# Patient Record
Sex: FEMALE | Race: WHITE | URBAN - METROPOLITAN AREA
[De-identification: names, ages, dates, MRNs, and addresses within clinical notes are randomized per-mention and may not be internally consistent; named-entity substitution may affect disease eponyms.]

---

## 2018-03-30 ENCOUNTER — OFFICE VISIT (OUTPATIENT)
Dept: FAMILY MEDICINE CLINIC | Age: 38
End: 2018-03-30

## 2018-03-30 VITALS
HEIGHT: 67 IN | BODY MASS INDEX: 26.53 KG/M2 | DIASTOLIC BLOOD PRESSURE: 74 MMHG | RESPIRATION RATE: 18 BRPM | OXYGEN SATURATION: 97 % | WEIGHT: 169 LBS | HEART RATE: 88 BPM | TEMPERATURE: 98 F | SYSTOLIC BLOOD PRESSURE: 130 MMHG

## 2018-03-30 DIAGNOSIS — S99.929A INJURY OF TOE, UNSPECIFIED LATERALITY, INITIAL ENCOUNTER: Primary | ICD-10-CM

## 2018-03-30 DIAGNOSIS — D22.9 MULTIPLE NEVI: ICD-10-CM

## 2018-03-30 PROBLEM — F17.200 SMOKING: Status: ACTIVE | Noted: 2018-03-30

## 2018-03-30 PROBLEM — L63.9 ALOPECIA AREATA: Status: ACTIVE | Noted: 2018-03-30

## 2018-03-30 NOTE — PATIENT INSTRUCTIONS
> Monitor the toes. If the pain worsens, please remove the toenail polish and return.     > Monitor the moles. Watch for any other irregularities or changes. Consider having your dermatologist take a look at them.     > Return at your convenience, having fasted for 8-12 hours for a full physical (with labs). The Autoimmune Paleo Diet focuses on eating foods that are thought to have been eaten by our kacie-gatherer ancestors, and on eliminating foods that result in autoimmune reactions in some people. It includes many vegetables, some fruits, wild or pastured meats, and seafood. In the initial phases, it restricts grains (especially those containing gluten), dairy, nightshade plants, nuts, seeds, and legumes. People with a variety of autoimmune conditions have reported benefiting from the diet, including those with lupus, rhuematoid arthritis, irritable bowel disease, Hashimoto's disease and others. https://Synergy Pharm[employer]. Vendavo/opt-in/    https://Math[employer] Engineering & Education/aip-for-ibd-the-paleo-autoimmune-protocol-and-inflammatory-bowel-disease/    https://FriendFeed/health-news/health-wellness/articles/2015/01/16/is-the-autoimmune-paleo-diet-legit    Check out this article by Dr Aramis Merediht on natural ways to improve autoimmune disease:  https://LaunchKey/4-steps-to-heal-leaky-gut-and-autoimmune-disease/    Dr Paloma Silverio also has a great video on YouTube:   https://youTablo Publishing. be/GmUctI-wWKM  (search for [facility] [employer] NOBLE to overcome autoimmune disease\")         Foot Pain: Care Instructions  Your Care Instructions  Foot injuries that cause pain and swelling are fairly common. Almost all sports or home repair projects can cause a misstep that ends up as foot pain. Normal wear and tear, especially as you get older, also can cause foot pain. Most minor foot injuries will heal on their own, and home treatment is usually all you need to do. If you have a severe injury, you may need tests and treatment.   Follow-up care is a key part of your treatment and safety. Be sure to make and go to all appointments, and call your doctor if you are having problems. It's also a good idea to know your test results and keep a list of the medicines you take. How can you care for yourself at home? · Take pain medicines exactly as directed. ¨ If the doctor gave you a prescription medicine for pain, take it as prescribed. ¨ If you are not taking a prescription pain medicine, ask your doctor if you can take an over-the-counter medicine. · Rest and protect your foot. Take a break from any activity that may cause pain. · Put ice or a cold pack on your foot for 10 to 20 minutes at a time. Put a thin cloth between the ice and your skin. · Prop up the sore foot on a pillow when you ice it or anytime you sit or lie down during the next 3 days. Try to keep it above the level of your heart. This will help reduce swelling. · Your doctor may recommend that you wrap your foot with an elastic bandage. Keep your foot wrapped for as long as your doctor advises. · If your doctor recommends crutches, use them as directed. · Wear roomy footwear. · As soon as pain and swelling end, begin gentle exercises of your foot. Your doctor can tell you which exercises will help. When should you call for help? Call 911 anytime you think you may need emergency care. For example, call if:  ? · Your foot turns pale, white, blue, or cold. ?Call your doctor now or seek immediate medical care if:  ? · You cannot move or stand on your foot. ? · Your foot looks twisted or out of its normal position. ? · Your foot is not stable when you step down. ? · You have signs of infection, such as:  ¨ Increased pain, swelling, warmth, or redness. ¨ Red streaks leading from the sore area. ¨ Pus draining from a place on your foot. ¨ A fever. ? · Your foot is numb or tingly. ? Watch closely for changes in your health, and be sure to contact your doctor if:  ? · You do not get better as expected. ? · You have bruises from an injury that last longer than 2 weeks. Where can you learn more? Go to http://irma-shayan.info/. Enter E204 in the search box to learn more about \"Foot Pain: Care Instructions. \"  Current as of: March 21, 2017  Content Version: 11.4  © 6046-2442 [employer]. Care instructions adapted under license by [employer] (which disclaims liability or warranty for this information). If you have questions about a medical condition or this instruction, always ask your healthcare professional. [place] [address] any warranty or liability for your use of this information.

## 2018-03-30 NOTE — PROGRESS NOTES
Rm:1    Chief Complaint   Patient presents with    Toe Pain     pt states she got Pedicure on Sunday and now has bilateral foot and toe pain     Flu Shot Requested: no    Depression Screening:  PHQ over the last two weeks 3/30/2018   Little interest or pleasure in doing things Not at all   Feeling down, depressed or hopeless Not at all   Total Score PHQ 2 0       Learning Assessment:  Learning Assessment 3/30/2018   PRIMARY LEARNER Patient   HIGHEST LEVEL OF EDUCATION - PRIMARY LEARNER  SOME COLLEGE   PRIMARY LANGUAGE ENGLISH   LEARNER PREFERENCE PRIMARY DEMONSTRATION   ANSWERED BY patient   RELATIONSHIP SELF       Abuse Screening:  No flowsheet data found. Health Maintenance reviewed and discussed per provider: yes     Coordination of Care:    1. Have you been to the ER, urgent care clinic since your last visit? Hospitalized since your last visit? no    2. Have you seen or consulted any other health care providers outside of the [address] [place] [address] since your last visit? Include any pap smears or colon screening.  no

## 2018-03-30 NOTE — MR AVS SNAPSHOT
[address] [address] 
[phone] Patient: Renaldo Grayson MRN: [id number] :1980 Visit Information Date & Time Provider Department Dept. Phone Encounter #  
 3/30/2018  2:15 PM Yunier Rios PA-C [facility] [phone] [id number] Follow-up Instructions Return in about 4 weeks (around 2018) for CPE. Upcoming Health Maintenance Date Due Pneumococcal 19-64 Medium Risk (1 of 1 - PPSV23) 12/3/1998 DTaP/Tdap/Td series (1 - Tdap) 12/3/2000 PAP AKA CERVICAL CYTOLOGY 12/3/2000 Influenza Age 5 to Adult 2017 Allergies as of 3/30/2018  Review Complete On: 3/30/2018 By: Yunier Rios PA-C Severity Noted Reaction Type Reactions Codeine High 2016    Anaphylaxis Current Immunizations  Never Reviewed No immunizations on file. Not reviewed this visit You Were Diagnosed With   
  
 Codes Comments Injury of toe, unspecified laterality, initial encounter    -  Primary ICD-10-CM: D35.831S 
ICD-9-CM: 959. 7 Vitals BP Pulse Temp Resp Height(growth percentile) Weight(growth percentile) 130/74 (BP 1 Location: Left arm, BP Patient Position: Sitting) 88 98 °F (36.7 °C) (Oral) 18 5' 7\" (1.702 m) 169 lb (76.7 kg) LMP SpO2 BMI OB Status Smoking Status 2018 (LMP Unknown) 97% 26.47 kg/m2 Having regular periods Current Every Day Smoker Vitals History BMI and BSA Data Body Mass Index Body Surface Area  
 26.47 kg/m 2 1.9 m 2 Preferred Pharmacy Pharmacy Name Phone [address] [place] [address] [address] [phone] Your Updated Medication List  
  
Notice  As of 3/30/2018  3:18 PM  
 You have not been prescribed any medications. Follow-up Instructions Return in about 4 weeks (around 2018) for CPE. Patient Instructions > Monitor the toes. If the pain worsens, please remove the toenail polish and return.  
 
> Monitor the moles. Watch for any other irregularities or changes. Consider having your dermatologist take a look at them.  
 
> Return at your convenience, having fasted for 8-12 hours for a full physical (with labs). The Autoimmune Paleo Diet focuses on eating foods that are thought to have been eaten by our kacie-gatherer ancestors, and on eliminating foods that result in autoimmune reactions in some people. It includes many vegetables, some fruits, wild or pastured meats, and seafood. In the initial phases, it restricts grains (especially those containing gluten), dairy, nightshade plants, nuts, seeds, and legumes. People with a variety of autoimmune conditions have reported benefiting from the diet, including those with lupus, rhuematoid arthritis, irritable bowel disease, Hashimoto's disease and others. https://Brilliant [employer]communications. Internet Broadcasting/opt-in/ 
 
https://PBJ Concierge/aip-for-ibd-the-paleo-autoimmune-protocol-and-inflammatory-bowel-disease/ 
 
https://Siren[employer] Discovery/health-news/health-wellness/articles/2015/01/16/is-the-autoimmune-paleo-diet-legit Check out this article by Dr Ed Jackson on natural ways to improve autoimmune disease: 
https://Blue Dot World/4-steps-to-heal-leaky-gut-and-autoimmune-disease/ 
 
Dr Jossie Jones also has a great video on YouTube:  
https://youScotty Gearu. be/GmUctI-wWKM 
(search for [facility] [employer] NOBLE to overcome autoimmune disease\") Foot Pain: Care Instructions Your Care Instructions Foot injuries that cause pain and swelling are fairly common. Almost all sports or home repair projects can cause a misstep that ends up as foot pain. Normal wear and tear, especially as you get older, also can cause foot pain. Most minor foot injuries will heal on their own, and home treatment is usually all you need to do. If you have a severe injury, you may need tests and treatment. Follow-up care is a key part of your treatment and safety. Be sure to make and go to all appointments, and call your doctor if you are having problems. It's also a good idea to know your test results and keep a list of the medicines you take. How can you care for yourself at home? · Take pain medicines exactly as directed. ¨ If the doctor gave you a prescription medicine for pain, take it as prescribed. ¨ If you are not taking a prescription pain medicine, ask your doctor if you can take an over-the-counter medicine. · Rest and protect your foot. Take a break from any activity that may cause pain. · Put ice or a cold pack on your foot for 10 to 20 minutes at a time. Put a thin cloth between the ice and your skin. · Prop up the sore foot on a pillow when you ice it or anytime you sit or lie down during the next 3 days. Try to keep it above the level of your heart. This will help reduce swelling. · Your doctor may recommend that you wrap your foot with an elastic bandage. Keep your foot wrapped for as long as your doctor advises. · If your doctor recommends crutches, use them as directed. · Wear roomy footwear. · As soon as pain and swelling end, begin gentle exercises of your foot. Your doctor can tell you which exercises will help. When should you call for help? Call 911 anytime you think you may need emergency care. For example, call if: 
? · Your foot turns pale, white, blue, or cold. ?Call your doctor now or seek immediate medical care if: 
? · You cannot move or stand on your foot. ? · Your foot looks twisted or out of its normal position. ? · Your foot is not stable when you step down. ? · You have signs of infection, such as: 
¨ Increased pain, swelling, warmth, or redness. ¨ Red streaks leading from the sore area. ¨ Pus draining from a place on your foot. ¨ A fever. ? · Your foot is numb or tingly. ? Watch closely for changes in your health, and be sure to contact your doctor if: ? · You do not get better as expected. ? · You have bruises from an injury that last longer than 2 weeks. Where can you learn more? Go to http://irma-shayan.info/. Enter X913 in the search box to learn more about \"Foot Pain: Care Instructions. \" Current as of: March 21, 2017 Content Version: 11.4 © 1987-1323 KartR[employer]. Care instructions adapted under license by [employer] (which disclaims liability or warranty for this information). If you have questions about a medical condition or this instruction, always ask your healthcare professional. [place] [address] any warranty or liability for your use of this information. Introducing hospitals & HEALTH SERVICES! Dotty Form introduces [email or web address] patient portal. Now you can access parts of your medical record, email your doctor's office, and request medication refills online. 1. In your internet browser, go to [email or web address] International 2. Click on the First Time User? Click Here link in the Sign In box. You will see the New Member Sign Up page. 3. Enter your SendMeHome.com Access Code exactly as it appears below. You will not need to use this code after youve completed the sign-up process. If you do not sign up before the expiration date, you must request a new code. · [email or web address] Access Code: [email or web address] Expires: 6/28/2018  3:18 PM 
 
4. Enter the last four digits of your Social Security Number (xxxx) and Date of Birth (mm/dd/yyyy) as indicated and click Submit. You will be taken to the next sign-up page. 5. Create a [email or web address] ID. This will be your [email or web address] login ID and cannot be changed, so think of one that is secure and easy to remember. 6. Create a [email or web address] password. You can change your password at any time. 7. Enter your Password Reset Question and Answer. This can be used at a later time if you forget your password. 8. Enter your e-mail address. You will receive e-mail notification when new information is available in [address] 9. Click Sign Up. You can now view and download portions of your medical record. 10. Click the Download Summary menu link to download a portable copy of your medical information. If you have questions, please visit the Frequently Asked Questions section of the [employer]Farms website. Remember, [employer]arms is NOT to be used for urgent needs. For medical emergencies, dial 911. Now available from your iPhone and Android! Please provide this summary of care documentation to your next provider. If you have any questions after today's visit, please call [phone].

## 2018-03-30 NOTE — PROGRESS NOTES
HISTORY OF PRESENT ILLNESS  Vikki Him is a 44 y.o. female. HPI Comments: Pt presents to [facility] care, and because of pain on her left great toe. States she went for a lorna-pedi on Sunday, and feels they were rough on her feet. Then she was having sensations under the toenail like someone was sticking a nail under the toenail. States the symptoms took a couple of days to develop, and her last sensation was yesterday afternoon. Denies any drainage of puss or blood from the toes or toenail. She also has a tiny black dot on the instep of her left foot. She first noticed it about 2 months ago. She also has an irregular mole around her right hip. States it used to be perfectly round, but it now has a sort of satellite. There is also a raised mole on the back, just below the bra line with pigmentation in the center (otherwise flesh-colored). Toe Pain         Review of Systems   Musculoskeletal:        + bilateral toe pain     Visit Vitals    /74 (BP 1 Location: Left arm, BP Patient Position: Sitting)    Pulse 88    Temp 98 °F (36.7 °C) (Oral)    Resp 18    Ht 5' 7\" (1.702 m)    Wt 169 lb (76.7 kg)    LMP 03/25/2018 (LMP Unknown)    SpO2 97%    BMI 26.47 kg/m2       Physical Exam   Constitutional: She is oriented to person, place, and time. Vital signs are normal. She appears well-developed and well-nourished. She is cooperative. She does not appear ill. No distress. HENT:   Head: Normocephalic and atraumatic. Right Ear: External ear normal.   Left Ear: External ear normal.   Nose: Nose normal. No nasal deformity. Eyes: Conjunctivae and EOM are normal. Pupils are equal, round, and reactive to light. Neck: Neck supple. Cardiovascular: Normal rate and regular rhythm. Pulses:       Dorsalis pedis pulses are 2+ on the right side, and 2+ on the left side. Posterior tibial pulses are 2+ on the right side, and 2+ on the left side.    Good capillary refill (< 3 sec) in all toes   Pulmonary/Chest: Effort normal.   Neurological: She is alert and oriented to person, place, and time. Sensation grossly intact in the toes. Proprioception normal in both great toes   Skin: Skin is warm and dry. All toes appear healthy. The toenail are painted, so I cant see to the nailbeds, but there is no erythema or drainage in the area of the nails/cuticles.     ~ 0.5 cm nevus with tiny satellite are of brown coloration ~ 2 with ~ 1 cm area of hypopigmentation. The lesion is just caudal to some abdominal striae on that side.     ~ 1/3 cm nevus on the left abdomen, predominantly circular    ~ 0.25 papular nevus near the spine (upper lumbar area) with 1 mm pigmented center. Psychiatric: She has a normal mood and affect. Her speech is normal and behavior is normal. Thought content normal.   Nursing note and vitals reviewed. ASSESSMENT and PLAN    ICD-10-CM ICD-9-CM    1. Injury of toe, unspecified laterality, initial encounter S99.929A 959.7    2. Multiple nevi D22.9 216.9      1. Toes appear generally healthy. By history, the issue seems to have resulted from trauma during pt's recent pedicure, and has all but resolved (though she does report the issue occurring briefly in the foot on which she was sitting). She is encouraged to remove her nail polish and return if symptoms return/worsen. 2. Nevi appear small and benign, however continued monitoring is recommended. Offered referral to dermatology. Pt indicates she has a dermatologist.     Encouraged pt to return fasting for CPE and pap smear. Pt verbalized understanding and agreement with the plan of care.     Nicole Thompson PA-C

## 2018-09-27 ENCOUNTER — HOSPITAL ENCOUNTER (OUTPATIENT)
Dept: LAB | Age: 38
Discharge: HOME | End: 2018-09-27
Payer: COMMERCIAL

## 2018-09-27 ENCOUNTER — OFFICE VISIT (OUTPATIENT)
Dept: FAMILY MEDICINE CLINIC | Age: 38
End: 2018-09-27

## 2018-09-27 VITALS
OXYGEN SATURATION: 98 % | HEIGHT: 67 IN | TEMPERATURE: 98.5 F | SYSTOLIC BLOOD PRESSURE: 113 MMHG | DIASTOLIC BLOOD PRESSURE: 83 MMHG | RESPIRATION RATE: 12 BRPM | WEIGHT: 166.6 LBS | BODY MASS INDEX: 26.15 KG/M2 | HEART RATE: 99 BPM

## 2018-09-27 DIAGNOSIS — R63.0 LOSS OF APPETITE: ICD-10-CM

## 2018-09-27 DIAGNOSIS — F17.200 SMOKING: ICD-10-CM

## 2018-09-27 DIAGNOSIS — Z13.220 ENCOUNTER FOR LIPID SCREENING FOR CARDIOVASCULAR DISEASE: ICD-10-CM

## 2018-09-27 DIAGNOSIS — R53.83 FATIGUE, UNSPECIFIED TYPE: ICD-10-CM

## 2018-09-27 DIAGNOSIS — Z13.6 ENCOUNTER FOR LIPID SCREENING FOR CARDIOVASCULAR DISEASE: ICD-10-CM

## 2018-09-27 DIAGNOSIS — Z00.00 ROUTINE GENERAL MEDICAL EXAMINATION AT A HEALTH CARE FACILITY: ICD-10-CM

## 2018-09-27 DIAGNOSIS — Z00.00 ROUTINE GENERAL MEDICAL EXAMINATION AT A HEALTH CARE FACILITY: Primary | ICD-10-CM

## 2018-09-27 LAB
ALBUMIN SERPL-MCNC: 4.5 G/DL (ref 3.4–5)
ALBUMIN/GLOB SERPL: 1.3 {RATIO} (ref 0.8–1.7)
ALP SERPL-CCNC: 60 U/L (ref 45–117)
ALT SERPL-CCNC: 19 U/L (ref 13–56)
ANION GAP SERPL CALC-SCNC: 12 MMOL/L (ref 3–18)
AST SERPL-CCNC: 18 U/L (ref 15–37)
BASOPHILS # BLD: 0.1 K/UL (ref 0–0.1)
BASOPHILS NFR BLD: 1 % (ref 0–2)
BILIRUB SERPL-MCNC: 0.6 MG/DL (ref 0.2–1)
BILIRUB UR QL STRIP: NORMAL
BUN SERPL-MCNC: 10 MG/DL (ref 7–18)
BUN/CREAT SERPL: 12 (ref 12–20)
CALCIUM SERPL-MCNC: 9.1 MG/DL (ref 8.5–10.1)
CHLORIDE SERPL-SCNC: 107 MMOL/L (ref 100–108)
CHOLEST SERPL-MCNC: 197 MG/DL
CO2 SERPL-SCNC: 23 MMOL/L (ref 21–32)
CREAT SERPL-MCNC: 0.86 MG/DL (ref 0.6–1.3)
DIFFERENTIAL METHOD BLD: ABNORMAL
EOSINOPHIL # BLD: 0.3 K/UL (ref 0–0.4)
EOSINOPHIL NFR BLD: 3 % (ref 0–5)
ERYTHROCYTE [DISTWIDTH] IN BLOOD BY AUTOMATED COUNT: 13.6 % (ref 11.6–14.5)
GLOBULIN SER CALC-MCNC: 3.4 G/DL (ref 2–4)
GLUCOSE SERPL-MCNC: 89 MG/DL (ref 74–99)
GLUCOSE UR-MCNC: NEGATIVE MG/DL
HCT VFR BLD AUTO: 45.2 % (ref 35–45)
HDLC SERPL-MCNC: 59 MG/DL (ref 40–60)
HDLC SERPL: 3.3 {RATIO} (ref 0–5)
HGB BLD-MCNC: 15.1 G/DL (ref 12–16)
KETONES P FAST UR STRIP-MCNC: NORMAL MG/DL
LDLC SERPL CALC-MCNC: 125.2 MG/DL (ref 0–100)
LIPID PROFILE,FLP: ABNORMAL
LYMPHOCYTES # BLD: 2.3 K/UL (ref 0.9–3.6)
LYMPHOCYTES NFR BLD: 24 % (ref 21–52)
MCH RBC QN AUTO: 30.7 PG (ref 24–34)
MCHC RBC AUTO-ENTMCNC: 33.4 G/DL (ref 31–37)
MCV RBC AUTO: 91.9 FL (ref 74–97)
MONOCYTES # BLD: 0.6 K/UL (ref 0.05–1.2)
MONOCYTES NFR BLD: 6 % (ref 3–10)
NEUTS SEG # BLD: 6.4 K/UL (ref 1.8–8)
NEUTS SEG NFR BLD: 66 % (ref 40–73)
PH UR STRIP: 5.5 [PH] (ref 4.6–8)
PLATELET # BLD AUTO: 261 K/UL (ref 135–420)
PMV BLD AUTO: 11.8 FL (ref 9.2–11.8)
POTASSIUM SERPL-SCNC: 4.4 MMOL/L (ref 3.5–5.5)
PROT SERPL-MCNC: 7.9 G/DL (ref 6.4–8.2)
PROT UR QL STRIP: NORMAL
RBC # BLD AUTO: 4.92 M/UL (ref 4.2–5.3)
SODIUM SERPL-SCNC: 142 MMOL/L (ref 136–145)
SP GR UR STRIP: 1.03 (ref 1–1.03)
TRIGL SERPL-MCNC: 64 MG/DL (ref ?–150)
UA UROBILINOGEN AMB POC: NORMAL (ref 0.2–1)
URINALYSIS CLARITY POC: CLEAR
URINALYSIS COLOR POC: NORMAL
URINE BLOOD POC: NEGATIVE
URINE LEUKOCYTES POC: NORMAL
URINE NITRITES POC: NEGATIVE
VLDLC SERPL CALC-MCNC: 12.8 MG/DL
WBC # BLD AUTO: 9.6 K/UL (ref 4.6–13.2)

## 2018-09-27 PROCEDURE — 80053 COMPREHEN METABOLIC PANEL: CPT | Performed by: PHYSICIAN ASSISTANT

## 2018-09-27 PROCEDURE — 84443 ASSAY THYROID STIM HORMONE: CPT | Performed by: PHYSICIAN ASSISTANT

## 2018-09-27 PROCEDURE — 85025 COMPLETE CBC W/AUTO DIFF WBC: CPT | Performed by: PHYSICIAN ASSISTANT

## 2018-09-27 PROCEDURE — 80061 LIPID PANEL: CPT | Performed by: PHYSICIAN ASSISTANT

## 2018-09-27 NOTE — PROGRESS NOTES
HISTORY OF PRESENT ILLNESS  Vidal Pelletier is a 44 y.o. female. HPI Comments: Pt present with c/o fatigue and loss of appetite. She notes her last period was 8-days long (whereas she normally has menses for 3-4 days). She admits to some premenstrual pain typically for a couple of days, but this last time it hurt the whole way through; and she passed something red that looked like a small sheet of plastic. She feels her appetite symptoms started at that time. She has to force feed herself. She c/o stomach pain that she knows is related to not eating. States she smokes about 1 pack a day, which has not changed these last four years; and has been smoking since she was 14. She has tried to quit and tried vaping. She hasn't had anything to eat yet today (just coffee and cigarettes). She has an 25year-old daughter at home who reportedly crticizes everything she does, and also has a roommate who has been difficult to live with; so she's \"stressed the fuck out all the time. \"       Review of Systems   Constitutional: Positive for malaise/fatigue. Neurological: Positive for weakness. Psychiatric/Behavioral: The patient is nervous/anxious. Visit Vitals    /83 (BP 1 Location: Right arm, BP Patient Position: Sitting)    Pulse 99    Temp 98.5 °F (36.9 °C) (Oral)    Resp 12    Ht 5' 7\" (1.702 m)    Wt 166 lb 9.6 oz (75.6 kg)    LMP 08/27/2018 (Within Days)    SpO2 98%    BMI 26.09 kg/m2       Physical Exam   Constitutional: She is oriented to person, place, and time. Vital signs are normal. She appears well-developed and well-nourished. She is cooperative. She does not appear ill. No distress. HENT:   Head: Normocephalic and atraumatic. Right Ear: Tympanic membrane, external ear and ear canal normal.   Left Ear: Tympanic membrane, external ear and ear canal normal.   Nose: Nose normal. No mucosal edema or rhinorrhea.    Mouth/Throat: Uvula is midline, oropharynx is clear and moist and mucous membranes are normal.   Eyes: Conjunctivae and EOM are normal. Pupils are equal, round, and reactive to light. Neck: Neck supple. Cardiovascular: Normal rate, regular rhythm and normal heart sounds. Exam reveals no gallop and no friction rub. No murmur heard. Pulses:       Radial pulses are 2+ on the right side, and 2+ on the left side. Pulmonary/Chest: Effort normal and breath sounds normal. She has no decreased breath sounds. She has no wheezes. She has no rhonchi. She has no rales. Abdominal: Soft. Normal appearance and bowel sounds are normal. There is no hepatosplenomegaly. There is no tenderness. Lymphadenopathy:     She has no cervical adenopathy. Neurological: She is alert and oriented to person, place, and time. Reflex Scores:       Patellar reflexes are 2+ on the right side and 2+ on the left side. Skin: Skin is warm and dry. No rash noted. Psychiatric: She has a normal mood and affect. Her speech is normal and behavior is normal. Thought content normal.   Nursing note and vitals reviewed. ASSESSMENT and PLAN    ICD-10-CM ICD-9-CM    1. Routine general medical examination at a health care facility Z00.00 V70.0 CBC WITH AUTOMATED DIFF      METABOLIC PANEL, COMPREHENSIVE      THYROID CASCADE PROFILE      AMB POC URINALYSIS DIP STICK AUTO W/O MICRO      LIPID PANEL   2. Loss of appetite R63.0 783.0 THYROID CASCADE PROFILE   3. Fatigue, unspecified type R53.83 780.79 CBC WITH AUTOMATED DIFF      METABOLIC PANEL, COMPREHENSIVE      THYROID CASCADE PROFILE   4. Encounter for lipid screening for cardiovascular disease Z13.220 V77.91 LIPID PANEL    Z13.6 V81.2    5. Smoking F17.200 305.1      1-4. Following labs for annual physical and possible physiologic antecedents to fatigue and loss of appetite. 5. Given pt's long-time smoking history, it seems unlikely that her smoking has finally overtaken her normal appetite.  However, she is encouraged to quit for its own sake, and given that taste and appetite to improve with smoking cessation, there is no time like the present. Provided after-visit information on  Reasons to quit smoking and deciding about smoking cessation medications. Pt verbalized understanding and agreement with the plan of care.     Karyn Peña PA-C

## 2018-09-27 NOTE — PATIENT INSTRUCTIONS
Learning About Benefits From Quitting Smoking  How does quitting smoking make you healthier? If you're thinking about quitting smoking, you may have a few reasons to be smoke-free. Your health may be one of them. · When you quit smoking, you lower your risks for cancer, lung disease, heart attack, stroke, blood vessel disease, and blindness from macular degeneration. · When you're smoke-free, you get sick less often, and you heal faster. You are less likely to get colds, flu, bronchitis, and pneumonia. · As a nonsmoker, you may find that your mood is better and you are less stressed. When and how will you feel healthier? Quitting has real health benefits that start from day 1 of being smoke-free. And the longer you stay smoke-free, the healthier you get and the better you feel. The first hours  · After just 20 minutes, your blood pressure and heart rate go down. That means there's less stress on your heart and blood vessels. · Within 12 hours, the level of carbon monoxide in your blood drops back to normal. That makes room for more oxygen. With more oxygen in your body, you may notice that you have more energy than when you smoked. After 2 weeks  · Your lungs start to work better. · Your risk of heart attack starts to drop. After 1 month  · When your lungs are clear, you cough less and breathe deeper, so it's easier to be active. · Your sense of taste and smell return. That means you can enjoy food more than you have since you started smoking. Over the years  · After 1 year, your risk of heart disease is half what it would be if you kept smoking. · After 5 years, your risk of stroke starts to shrink. Within a few years after that, it's about the same as if you'd never smoked. · After 10 years, your risk of dying from lung cancer is cut by about half. And your risk for many other types of cancer is lower too. How would quitting help others in your life?   When you quit smoking, you improve the health of everyone who now breathes in your smoke. · Their heart, lung, and cancer risks drop, much like yours. · They are sick less. For babies and small children, living smoke-free means they're less likely to have ear infections, pneumonia, and bronchitis. · If you're a woman who is or will be pregnant someday, quitting smoking means a healthier . · Children who are close to you are less likely to become adult smokers. Where can you learn more? Go to http://irma-shayan.info/. Enter 052 [id number] 72 11 in the search box to learn more about \"Learning About Benefits From Quitting Smoking. \"  Current as of: 2017  Content Version: 11.7  © 5779-3068 [employer]Maker CRM. Care instructions adapted under license by [employer] (which disclaims liability or warranty for this information). If you have questions about a medical condition or this instruction, always ask your healthcare professional. [place] [address] any warranty or liability for your use of this information. Deciding About Using Medicines To Quit Smoking  Deciding About Using Medicines To Quit Smoking  What are the medicines you can use? Your doctor may prescribe varenicline (Chantix) or bupropion (Zyban). These medicines can help you cope with cravings for tobacco. They are pills that don't contain nicotine. You also can use nicotine replacement products. These do contain nicotine. There are many types. · Gum and lozenges slowly release nicotine into your mouth. · Patches stick to your skin. They slowly release nicotine into your bloodstream.  · An inhaler has a cerda that contains nicotine. You breathe in a puff of nicotine vapor through your mouth and throat. · Nasal spray releases a mist that contains nicotine. What are key points about this decision? · Using medicines can double your chances of quitting smoking. They can ease cravings and withdrawal symptoms.   · Getting counseling along with using medicine can raise your chances of quitting even more. · If you smoke fewer than 5 cigarettes a day, you may not need medicines to help you quit smoking. · These medicines have less nicotine than cigarettes. And by itself, nicotine is not nearly as harmful as smoking. The tars, carbon monoxide, and other toxic chemicals in tobacco cause the harmful effects. · The side effects of nicotine replacement products depend on the type of product. For example, a patch can make your skin red and itchy. Medicines in pill form can make you sick to your stomach. They can also cause dry mouth and trouble sleeping. For most people, the side effects are not bad enough to make them stop using the products. Why might you choose to use medicines to quit smoking? · You have tried on your own to stop smoking, but you were not able to stop. · You smoke more than 5 cigarettes a day. · You want to increase your chances of quitting smoking. · You want to reduce your cravings and withdrawal symptoms. · You feel the benefits of medicine outweigh the side effects. Why might you choose not to use medicine? · You want to try quitting on your own by stopping all at once (\"cold turkey\"). · You want to cut back slowly on the number of cigarettes you smoke. · You smoke fewer than 5 cigarettes a day. · You do not like using medicine. · You feel the side effects of medicines outweigh the benefits. · You are worried about the cost of medicines. Your decision  Thinking about the facts and your feelings can help you make a decision that is right for you. Be sure you understand the benefits and risks of your options, and think about what else you need to do before you make the decision. Where can you learn more? Go to http://irma-shayan.info/. Enter C677 in the search box to learn more about \"Deciding About Using Medicines To Quit Smoking. \"  Current as of: November 29, 2017  Content Version: 11.7  © 1574-7184 [employer]. Care instructions adapted under license by [employer] (which disclaims liability or warranty for this information). If you have questions about a medical condition or this instruction, always ask your healthcare professional. [address] any warranty or liability for your use of this information. Try these quick mindfulness exercise. Setting aside a few minutes every day can help with stress and focus,  Or you can do it when you're feeling stressed:     https://Nutrinia. Lax.com/lOuAJSFaM3f  It can be found on Prescreenube under   \"5-Minute Mindful Breathing Meditation\"  Stop, Breathe & Think    [email or web address].no  \"Mindfulness Guided Meditation - 5 Minutes\"  by Dr. Lucrecia Moser. Farzaneh    https://Nutrinia. be/8Y7IxdHISFi  10 minute Mindfulness Breathing Exercise    Meditation has been shown in scientific studies to have a whole host of benefits from reductions in anxiety, blood pressure, and hemoglobin A1C to improvements in concentration, sleep, and general well-being. Check out this article from Peter Villafuerte on the benefits of regular meditation and mindfulness practices: https://Captricity/wqpfglrafyk-vjxcawth-reljkc-mindfulness-can-help/    Here's a quick (10 minute) mindfulness exercise:   https://Nutrinia. be/3B8JnqAJIMy  \"Mindfulness Breathing Exercise\"  (on YouTube)    If you don't have access to a gym or any fitness equipment at home, there are still plenty of ways to stay in shape.  Check out this web site for some ideas:   http://[place]/fitness/cardio-bodyweight-exercises

## 2018-09-27 NOTE — MR AVS SNAPSHOT
[address] [place] [id number] 
[phone] Patient: Jeremiah Belle MRN: [id number] :[id number] Visit Information Date & Time Provider Department Dept. Phone Encounter #  
 2018  9:30 AM Angel Luis Chambers PA-C [facility] [phone] [id number] Upcoming Health Maintenance Date Due Pneumococcal 19-64 Medium Risk (1 of 1 - PPSV23) 12/3/1998 DTaP/Tdap/Td series (1 - Tdap) 12/3/2000 PAP AKA CERVICAL CYTOLOGY 12/3/2000 Influenza Age 5 to Adult 2018 Allergies as of 2018  Review Complete On: 2018 By: Angel Luis Chambers PA-C Severity Noted Reaction Type Reactions Codeine High 2016    Anaphylaxis Current Immunizations  Never Reviewed No immunizations on file. Not reviewed this visit You Were Diagnosed With   
  
 Codes Comments Routine general medical examination at a health care facility    -  Primary ICD-10-CM: Z00.00 ICD-9-CM: V70.0 Loss of appetite     ICD-10-CM: R63.0 ICD-9-CM: 783.0 Fatigue, unspecified type     ICD-10-CM: R53.83 ICD-9-CM: 780.79 Encounter for lipid screening for cardiovascular disease     ICD-10-CM: Z13.220, Z13.6 ICD-9-CM: V77.91, V81.2 Vitals BP Pulse Temp Resp Height(growth percentile) Weight(growth percentile) 113/83 (BP 1 Location: Right arm, BP Patient Position: Sitting) 99 98.5 °F (36.9 °C) (Oral) 12 5' 7\" (1.702 m) 166 lb 9.6 oz (75.6 kg) LMP SpO2 BMI OB Status Smoking Status 2018 (Within Days) 98% 26.09 kg/m2 Having regular periods Current Every Day Smoker BMI and BSA Data Body Mass Index Body Surface Area 26.09 kg/m 2 1.89 m 2 Preferred Pharmacy Pharmacy Name Phone [address] [place] [address] [address], [address] Moser. [phone] Your Updated Medication List  
  
   
This list is accurate as of 18 10:21 AM.  Always use your most recent med list.  
 CLARITIN-D 24 HOUR PO Take  by mouth. We Performed the Following AMB POC URINALYSIS DIP STICK AUTO W/O MICRO [74147 CPT(R)] Patient Instructions Learning About Benefits From Quitting Smoking How does quitting smoking make you healthier? If you're thinking about quitting smoking, you may have a few reasons to be smoke-free. Your health may be one of them. · When you quit smoking, you lower your risks for cancer, lung disease, heart attack, stroke, blood vessel disease, and blindness from macular degeneration. · When you're smoke-free, you get sick less often, and you heal faster. You are less likely to get colds, flu, bronchitis, and pneumonia. · As a nonsmoker, you may find that your mood is better and you are less stressed. When and how will you feel healthier? Quitting has real health benefits that start from day 1 of being smoke-free. And the longer you stay smoke-free, the healthier you get and the better you feel. The first hours · After just 20 minutes, your blood pressure and heart rate go down. That means there's less stress on your heart and blood vessels. · Within 12 hours, the level of carbon monoxide in your blood drops back to normal. That makes room for more oxygen. With more oxygen in your body, you may notice that you have more energy than when you smoked. After 2 weeks · Your lungs start to work better. · Your risk of heart attack starts to drop. After 1 month · When your lungs are clear, you cough less and breathe deeper, so it's easier to be active. · Your sense of taste and smell return. That means you can enjoy food more than you have since you started smoking. Over the years · After 1 year, your risk of heart disease is half what it would be if you kept smoking. · After 5 years, your risk of stroke starts to shrink. Within a few years after that, it's about the same as if you'd never smoked. · After 10 years, your risk of dying from lung cancer is cut by about half. And your risk for many other types of cancer is lower too. How would quitting help others in your life? When you quit smoking, you improve the health of everyone who now breathes in your smoke. · Their heart, lung, and cancer risks drop, much like yours. · They are sick less. For babies and small children, living smoke-free means they're less likely to have ear infections, pneumonia, and bronchitis. · If you're a woman who is or will be pregnant someday, quitting smoking means a healthier . · Children who are close to you are less likely to become adult smokers. Where can you learn more? Go to http://irma-shayan.info/. Enter 052 [id number] [id number] 11 in the search box to learn more about \"Learning About Benefits From Quitting Smoking. \" Current as of: 2017 Content Version: 11.7 © 4275-9721 GreatCall. Care instructions adapted under license by TheShelf (which disclaims liability or warranty for this information). If you have questions about a medical condition or this instruction, always ask your healthcare professional. [place] [address] any warranty or liability for your use of this information. Deciding About Using Medicines To Quit Smoking Deciding About Using Medicines To Quit Smoking What are the medicines you can use? Your doctor may prescribe varenicline (Chantix) or bupropion (Zyban). These medicines can help you cope with cravings for tobacco. They are pills that don't contain nicotine. You also can use nicotine replacement products. These do contain nicotine. There are many types. · Gum and lozenges slowly release nicotine into your mouth. · Patches stick to your skin. They slowly release nicotine into your bloodstream. 
· An inhaler has a cerda that contains nicotine. You breathe in a puff of nicotine vapor through your mouth and throat. · Nasal spray releases a mist that contains nicotine. What are key points about this decision? · Using medicines can double your chances of quitting smoking. They can ease cravings and withdrawal symptoms. · Getting counseling along with using medicine can raise your chances of quitting even more. · If you smoke fewer than 5 cigarettes a day, you may not need medicines to help you quit smoking. · These medicines have less nicotine than cigarettes. And by itself, nicotine is not nearly as harmful as smoking. The tars, carbon monoxide, and other toxic chemicals in tobacco cause the harmful effects. · The side effects of nicotine replacement products depend on the type of product. For example, a patch can make your skin red and itchy. Medicines in pill form can make you sick to your stomach. They can also cause dry mouth and trouble sleeping. For most people, the side effects are not bad enough to make them stop using the products. Why might you choose to use medicines to quit smoking? · You have tried on your own to stop smoking, but you were not able to stop. · You smoke more than 5 cigarettes a day. · You want to increase your chances of quitting smoking. · You want to reduce your cravings and withdrawal symptoms. · You feel the benefits of medicine outweigh the side effects. Why might you choose not to use medicine? · You want to try quitting on your own by stopping all at once (\"cold turkey\"). · You want to cut back slowly on the number of cigarettes you smoke. · You smoke fewer than 5 cigarettes a day. · You do not like using medicine. · You feel the side effects of medicines outweigh the benefits. · You are worried about the cost of medicines. Your decision Thinking about the facts and your feelings can help you make a decision that is right for you. Be sure you understand the benefits and risks of your options, and think about what else you need to do before you make the decision. Where can you learn more? Go to http://irma-shayan.info/. Enter B117 in the search box to learn more about \"Deciding About Using Medicines To Quit Smoking. \" Current as of: November 29, 2017 Content Version: 11.7 © 5026-6316 ClariFI. Care instructions adapted under license by [employer] (which disclaims liability or warranty for this information). If you have questions about a medical condition or this instruction, always ask your healthcare professional. [address] any warranty or liability for your use of this information. Try these quick mindfulness exercise. Setting aside a few minutes every day can help with stress and focus, Or you can do it when you're feeling stressed:  
 
https://SNUPI Technologies. Edkimo/eBgADSVtO9q It can be found on [employer] capital online science and technologyube under \"5-Minute Mindful Breathing Meditation\" Stop, Breathe & Think 
 
IRSCoupons.no \"Mindfulness Guided Meditation - 5 Minutes\" 
by Dr. Fabiana Chan. Farzaneh 
 
https://SNUPI Technologies. Edkimo/2Q3LbjXXEOp 10 minute Mindfulness Breathing Exercise Meditation has been shown in scientific studies to have a whole host of benefits from reductions in anxiety, blood pressure, and hemoglobin A1C to improvements in concentration, sleep, and general well-being. Check out this article from Indigo Mack on the benefits of regular meditation and mindfulness practices: https://[employer]ore [employer] plc/yunrdvwpmto-febiytvq-dizejq-mindfulness-can-help/ 
 
Here's a quick (10 minute) mindfulness exercise:  
https://SNUPI Technologies. be/4S6RjhYQTVy \"Mindfulness Breathing Exercise\" (on YouTube) If you don't have access to a gym or any fitness equipment at home, there are still plenty of ways to stay in shape. Check out this web site for some ideas:  
http://Concorde Solutions/fitness/cardio-bodyweight-exercises Introducing \[facility] [place] [facility]\"" & HEALTH SERVICES! [employer] introduces [employer] patient portal. Now you can access parts of your medical record, email your doctor's office, and request medication refills online. 1. In your internet browser, go to [email or web address] 2. Click on the First Time User? Click Here link in the Sign In box. You will see the New Member Sign Up page. 3. Enter your [employer] Access Code exactly as it appears below. You will not need to use this code after youve completed the sign-up process. If you do not sign up before the expiration date, you must request a new code. · [employer] Access Code: [email or web address] Expires: 12/26/2018 10:21 AM 
 
4. Enter the last four digits of your Social Security Number (xxxx) and Date of Birth (mm/dd/yyyy) as indicated and click Submit. You will be taken to the next sign-up page. 5. Create a [employer] ID. This will be your [employer] login ID and cannot be changed, so think of one that is secure and easy to remember. 6. Create a [employer] password. You can change your password at any time. 7. Enter your Password Reset Question and Answer. This can be used at a later time if you forget your password. 8. Enter your e-mail address. You will receive e-mail notification when new information is available in [facility] 9. Click Sign Up. You can now view and download portions of your medical record. 10. Click the Download Summary menu link to download a portable copy of your medical information. If you have questions, please visit the Frequently Asked Questions section of the [employer] website. Remember, [employer] is NOT to be used for urgent needs. For medical emergencies, dial 911. Now available from your iPhone and Android! Please provide this summary of care documentation to your next provider. If you have any questions after today's visit, please call [phone].

## 2018-09-27 NOTE — PROGRESS NOTES
Rm 1  Patient presents to the clinic complaining of loss of appetite x 3 weeks making her fatigue. Patient stated forcing self to eat in afternoon and drink fluids but has no desire to eat anything or drink, stomach pains. Depression Screening:  PHQ over the last two weeks 3/30/2018   Little interest or pleasure in doing things Not at all   Feeling down, depressed, irritable, or hopeless Not at all   Total Score PHQ 2 0       Learning Assessment:  Learning Assessment 3/30/2018   PRIMARY LEARNER Patient   HIGHEST LEVEL OF EDUCATION - PRIMARY LEARNER  SOME COLLEGE   PRIMARY LANGUAGE ENGLISH   LEARNER PREFERENCE PRIMARY DEMONSTRATION   ANSWERED BY patient   RELATIONSHIP SELF       Abuse Screening:  No flowsheet data found. Health Maintenance reviewed and discussed per provider: yes     Coordination of Care:    1. Have you been to the ER, urgent care clinic since your last visit? Hospitalized since your last visit? no    2. Have you seen or consulted any other health care providers outside of the [address] [place] [address] since your last visit? Include any pap smears or colon screening.  no

## 2018-09-28 ENCOUNTER — TELEPHONE (OUTPATIENT)
Dept: FAMILY MEDICINE CLINIC | Age: 38
End: 2018-09-28

## 2018-09-28 DIAGNOSIS — F41.0 ANXIETY ATTACK: Primary | ICD-10-CM

## 2018-09-28 LAB — TSH SERPL-ACNC: 1.7 UIU/ML (ref 0.36–3.74)

## 2018-09-28 RX ORDER — CLONAZEPAM 1 MG/1
TABLET ORAL
Qty: 15 TAB | Refills: 0 | Status: SHIPPED | OUTPATIENT
Start: 2018-09-28 | End: 2018-12-13

## 2018-09-28 NOTE — PROGRESS NOTES
Call made to pt, both name and  verified, pt was advised of results, pt verbalized understanding and then asked if labs are fairly normal that doesn't explain why is she still not having appetite. Pt wants further testing or to be referred to specialist for further evaluation.

## 2018-09-28 NOTE — TELEPHONE ENCOUNTER
Please let pt know the prescription is is ready & that we don't typically prescribe this medication on an ongoing basis, so this prescription should be understood as one-time, for limited short-term use.

## 2018-09-28 NOTE — TELEPHONE ENCOUNTER
Patient stated if she could request a prescription of Clonazepam due to her anxiety. Patient stated she was here yesterday to do labs and she said they came back normal. Patient stated that she is missing work due to anxiety attacks.

## 2018-09-28 NOTE — PROGRESS NOTES
Please let pt know her LDL is a bit high and her hematocrit is slightly elevated (likely from smoking). Other than that, her labs are perfectly normal. Is she feeling any better today?

## 2018-12-13 ENCOUNTER — OFFICE VISIT (OUTPATIENT)
Dept: FAMILY MEDICINE CLINIC | Age: 38
End: 2018-12-13

## 2018-12-13 VITALS
SYSTOLIC BLOOD PRESSURE: 117 MMHG | RESPIRATION RATE: 16 BRPM | HEIGHT: 67 IN | DIASTOLIC BLOOD PRESSURE: 81 MMHG | WEIGHT: 170.8 LBS | TEMPERATURE: 98.8 F | BODY MASS INDEX: 26.81 KG/M2 | HEART RATE: 105 BPM | OXYGEN SATURATION: 98 %

## 2018-12-13 DIAGNOSIS — J32.0 CHRONIC MAXILLARY SINUSITIS: Primary | ICD-10-CM

## 2018-12-13 DIAGNOSIS — F17.200 SMOKER: ICD-10-CM

## 2018-12-13 RX ORDER — AMOXICILLIN AND CLAVULANATE POTASSIUM 875; 125 MG/1; MG/1
1 TABLET, FILM COATED ORAL EVERY 12 HOURS
Qty: 20 TAB | Refills: 0 | Status: SHIPPED | OUTPATIENT
Start: 2018-12-13 | End: 2018-12-23

## 2018-12-13 NOTE — PATIENT INSTRUCTIONS
Take the antibiotic for 10 days. I recommend sinus flushes daily to open things up.     Recheck 1 week if you're not better

## 2018-12-13 NOTE — PROGRESS NOTES
Rm 2  Patient presents to the clinic c/o sinus pressure, blurred vision and swollen glands. Went to urgent care on 12- but feels worse than before. Depression Screening:  PHQ over the last two weeks 12/13/2018 9/27/2018 3/30/2018   Little interest or pleasure in doing things Not at all Several days Not at all   Feeling down, depressed, irritable, or hopeless Not at all More than half the days Not at all   Total Score PHQ 2 0 3 0       Learning Assessment:  Learning Assessment 3/30/2018   PRIMARY LEARNER Patient   HIGHEST LEVEL OF EDUCATION - PRIMARY LEARNER  SOME COLLEGE   PRIMARY LANGUAGE ENGLISH   LEARNER PREFERENCE PRIMARY DEMONSTRATION   ANSWERED BY patient   RELATIONSHIP SELF       Abuse Screening:  No flowsheet data found. Health Maintenance reviewed and discussed per provider: yes     Coordination of Care:    1. Have you been to the ER, urgent care clinic since your last visit? Hospitalized since your last visit? no    2. Have you seen or consulted any other health care providers outside of the [address] [place] [address] since your last visit? Include any pap smears or colon screening.  No

## 2018-12-13 NOTE — PROGRESS NOTES
HISTORY OF PRESENT ILLNESS  Lucia Stein is a 43 y.o. female. Roverto Hooker is here because of a possible sinus infection. She has had facial congestion for a month and now she has a headache and swollen glands. She went to an urgent care center and was put on prednisone, but she didn't like taking it because it suppresses her immune system. No cough or fever. She is a smoker. Review of Systems   Constitutional: Positive for malaise/fatigue. Negative for chills and fever. HENT: Positive for congestion and sinus pain. Negative for ear pain, nosebleeds, sore throat and tinnitus. Eyes: Negative for blurred vision and discharge. Respiratory: Negative for cough, hemoptysis, sputum production and wheezing. Cardiovascular: Negative for chest pain and palpitations. Gastrointestinal: Negative for heartburn. Genitourinary: Negative for dysuria and urgency. Neurological: Positive for headaches. Physical Exam   Constitutional: Vital signs are normal. She appears well-developed and well-nourished. HENT:   Right Ear: Tympanic membrane and ear canal normal.   Left Ear: Tympanic membrane and ear canal normal.   Nose: Right sinus exhibits maxillary sinus tenderness. Left sinus exhibits maxillary sinus tenderness. Mouth/Throat: Uvula is midline, oropharynx is clear and moist and mucous membranes are normal.   Eyes: Pupils are equal, round, and reactive to light. Cardiovascular: Normal rate, regular rhythm and normal heart sounds. Pulmonary/Chest: Effort normal and breath sounds normal. No respiratory distress. She has no wheezes. She has no rales. Abdominal: She exhibits no distension. There is no tenderness. Skin: No rash noted. Nursing note and vitals reviewed. ASSESSMENT and PLAN    ICD-10-CM ICD-9-CM    1. Chronic maxillary sinusitis J32.0 473.0 amoxicillin-clavulanate (AUGMENTIN) 875-125 mg per tablet   2.  Smoker F17.200 305.1